# Patient Record
Sex: MALE | Race: BLACK OR AFRICAN AMERICAN | URBAN - METROPOLITAN AREA
[De-identification: names, ages, dates, MRNs, and addresses within clinical notes are randomized per-mention and may not be internally consistent; named-entity substitution may affect disease eponyms.]

---

## 2024-07-12 ENCOUNTER — APPOINTMENT (OUTPATIENT)
Dept: PRIMARY CARE | Facility: CLINIC | Age: 24
End: 2024-07-12
Payer: MEDICARE

## 2024-08-27 ENCOUNTER — APPOINTMENT (OUTPATIENT)
Dept: PRIMARY CARE | Facility: CLINIC | Age: 24
End: 2024-08-27
Payer: MEDICARE

## 2024-08-27 VITALS
OXYGEN SATURATION: 97 % | WEIGHT: 128 LBS | DIASTOLIC BLOOD PRESSURE: 61 MMHG | BODY MASS INDEX: 18.96 KG/M2 | SYSTOLIC BLOOD PRESSURE: 98 MMHG | HEIGHT: 69 IN | HEART RATE: 71 BPM

## 2024-08-27 DIAGNOSIS — Z00.00 HEALTH CARE MAINTENANCE: ICD-10-CM

## 2024-08-27 DIAGNOSIS — Z76.89 ENCOUNTER TO ESTABLISH CARE: ICD-10-CM

## 2024-08-27 DIAGNOSIS — Z23 IMMUNIZATION DUE: ICD-10-CM

## 2024-08-27 DIAGNOSIS — Z13.220 LIPID SCREENING: ICD-10-CM

## 2024-08-27 DIAGNOSIS — Z11.3 SCREENING EXAMINATION FOR STD (SEXUALLY TRANSMITTED DISEASE): ICD-10-CM

## 2024-08-27 DIAGNOSIS — J30.2 SEASONAL ALLERGIES: Primary | ICD-10-CM

## 2024-08-27 PROCEDURE — 99385 PREV VISIT NEW AGE 18-39: CPT

## 2024-08-27 PROCEDURE — 3008F BODY MASS INDEX DOCD: CPT

## 2024-08-27 RX ORDER — CETIRIZINE HYDROCHLORIDE 10 MG/1
10 TABLET ORAL DAILY
Qty: 30 TABLET | Refills: 2 | Status: SHIPPED | OUTPATIENT
Start: 2024-08-27 | End: 2024-11-25

## 2024-08-27 ASSESSMENT — ENCOUNTER SYMPTOMS
DIARRHEA: 0
NECK PAIN: 0
FEVER: 0
ABDOMINAL PAIN: 0
DEPRESSION: 0
FATIGUE: 0
HEADACHES: 0
COUGH: 0
NAUSEA: 0
CONSTIPATION: 0
UNEXPECTED WEIGHT CHANGE: 0
DIFFICULTY URINATING: 0
DIZZINESS: 0
BLOOD IN STOOL: 0
CHILLS: 0
RHINORRHEA: 0
SHORTNESS OF BREATH: 0
LOSS OF SENSATION IN FEET: 0
DYSURIA: 0
LIGHT-HEADEDNESS: 0
CHEST TIGHTNESS: 0
VOMITING: 0
OCCASIONAL FEELINGS OF UNSTEADINESS: 0
PALPITATIONS: 0

## 2024-08-27 ASSESSMENT — PATIENT HEALTH QUESTIONNAIRE - PHQ9
2. FEELING DOWN, DEPRESSED OR HOPELESS: NOT AT ALL
SUM OF ALL RESPONSES TO PHQ9 QUESTIONS 1 AND 2: 0
1. LITTLE INTEREST OR PLEASURE IN DOING THINGS: NOT AT ALL

## 2024-08-27 ASSESSMENT — PAIN SCALES - GENERAL: PAINLEVEL: 0-NO PAIN

## 2024-08-27 NOTE — PROGRESS NOTES
"Subjective   Patient ID: Dev Florez is a 23 y.o. male who presents for Establish Care (physical).  Mr. Florez is here to establish care and for a physical exam. He would like to know his blood type.      Patient doing well with no acute complaints at this time    Previous PCP: Never seen    PAST MEDICAL HISTORY:  - Was diagnosed with ADHD, took Focilin in the past but does not need it anymore.  - Reports seasonal allergies,     PAST SURGICAL HISTORY:  - Stomach surgery in 2004 or 2005 \"belly button sticking out\"    FAMILY HISTORY:  - Paternal grandmother passed of thyroid cancer, 67    SOCIAL HISTORY:  Tobacco: A few cigarettes as a teenager   ETOH: No  Drug: Occasional marijuana use for shoulder pain  Sexual hx: Not currently, has been as a teenager  Occupation: Looking for a job    ALLERGIES:  - Pollen    RECENT HOSPITALIZATIONS:  - No    RECENT PROCEDURES:  - No    PAST PREVENTATIVE CARE:  - None in a long time    Review of Systems   Constitutional:  Negative for chills, fatigue, fever and unexpected weight change.   HENT:  Negative for congestion, ear pain, rhinorrhea and sneezing.    Respiratory:  Negative for cough, chest tightness and shortness of breath.    Cardiovascular:  Negative for chest pain, palpitations and leg swelling.   Gastrointestinal:  Negative for abdominal pain, blood in stool, constipation, diarrhea, nausea and vomiting.   Genitourinary:  Negative for difficulty urinating and dysuria.   Musculoskeletal:  Negative for neck pain.        L shoulder pain, no pain today   Allergic/Immunologic: Positive for environmental allergies.   Neurological:  Negative for dizziness, light-headedness and headaches.       Objective   Physical Exam  Constitutional:       Appearance: Normal appearance.   Cardiovascular:      Rate and Rhythm: Normal rate and regular rhythm.      Pulses: Normal pulses.      Heart sounds: Normal heart sounds.   Pulmonary:      Effort: Pulmonary effort is normal.      Breath " sounds: Normal breath sounds.   Neurological:      Mental Status: He is alert.   Psychiatric:         Mood and Affect: Mood normal.         Behavior: Behavior normal.         Thought Content: Thought content normal.         Judgment: Judgment normal.         Assessment/Plan   Diagnoses and all orders for this visit:  Seasonal allergies  -     cetirizine (ZyrTEC) 10 mg tablet; Take 1 tablet (10 mg) by mouth once daily.  Lipid screening  -     Lipid panel; Future  Encounter to establish care  Health care maintenance  -     HIV 1/2 Antigen/Antibody Screen with Reflex to Confirmation; Future  -     Hepatitis C antibody; Future  -     C. trachomatis / N. gonorrhoeae, DNA probe; Future  -     Hepatitis B surface antibody; Future  Screening examination for STD (sexually transmitted disease)  -     HIV 1/2 Antigen/Antibody Screen with Reflex to Confirmation; Future  -     Hepatitis C antibody; Future  -     C. trachomatis / N. gonorrhoeae, DNA probe; Future  -     Hepatitis B surface antibody; Future  Immunization due  -     Tdap vaccine, age 7 years and older  (BOOSTRIX); Future  Other orders  -     Follow Up In Primary Care; Future        Plan to take over care which includes but not limited to:     -Conducting routine check-ups, physical exams, and health screenings to detect and prevent health problems  -Monitoring and managing chronic diseases   -Providing counseling and education on health promotion, disease prevention, and lifestyle modifications.  -Coordinating care with other healthcare providers to ensure continuity and quality of care.

## 2024-08-28 PROCEDURE — RXMED WILLOW AMBULATORY MEDICATION CHARGE

## 2024-08-28 ASSESSMENT — ENCOUNTER SYMPTOMS
RHINORRHEA: 0
HEADACHES: 0
DIFFICULTY URINATING: 0
FEVER: 0
LIGHT-HEADEDNESS: 0
ABDOMINAL PAIN: 0
PALPITATIONS: 0
NAUSEA: 0
CHILLS: 0
FATIGUE: 0
NECK PAIN: 0
BLOOD IN STOOL: 0
VOMITING: 0
CHEST TIGHTNESS: 0
UNEXPECTED WEIGHT CHANGE: 0
COUGH: 0
DIARRHEA: 0
DIZZINESS: 0
SHORTNESS OF BREATH: 0
CONSTIPATION: 0
DYSURIA: 0

## 2024-08-28 NOTE — PROGRESS NOTES
"Subjective   Patient ID: Dev Florez is a 23 y.o. male who presents for Establish Care (physical).  Mr. Florez is here to establish care and for a physical exam. He would like to know his blood type.      Patient doing well with no acute complaints at this time.   Wanted to check blood type. Explained it won't be covered by his insurance, so patient deferred it.     Previous PCP: Never seen    PAST MEDICAL HISTORY:  - Was diagnosed with ADHD, took Focalin in the past but does not need it anymore.  - Reports seasonal allergies    PAST SURGICAL HISTORY:  - Stomach surgery in 2004 or 2005 \"belly button sticking out\"    FAMILY HISTORY:  - Paternal grandmother passed of thyroid cancer, 67    SOCIAL HISTORY:  Tobacco: A few cigarettes as a teenager   ETOH: No  Drug: Occasional marijuana use for shoulder pain  Sexual hx: Not currently, has been as a teenager  Occupation: Looking for a job    ALLERGIES:  - Pollen    RECENT HOSPITALIZATIONS:  - No    RECENT PROCEDURES:  - No    PAST PREVENTATIVE CARE:  - None in a long time    Review of Systems   Constitutional:  Negative for chills, fatigue, fever and unexpected weight change.   HENT:  Negative for congestion, ear pain, rhinorrhea and sneezing.    Respiratory:  Negative for cough, chest tightness and shortness of breath.    Cardiovascular:  Negative for chest pain, palpitations and leg swelling.   Gastrointestinal:  Negative for abdominal pain, blood in stool, constipation, diarrhea, nausea and vomiting.   Genitourinary:  Negative for difficulty urinating and dysuria.   Musculoskeletal:  Negative for neck pain.        L shoulder pain, no pain today   Allergic/Immunologic: Positive for environmental allergies.   Neurological:  Negative for dizziness, light-headedness and headaches.       Objective   Physical Exam  Constitutional:       Appearance: Normal appearance.   Cardiovascular:      Rate and Rhythm: Normal rate and regular rhythm.      Pulses: Normal pulses.      " Heart sounds: Normal heart sounds.   Pulmonary:      Effort: Pulmonary effort is normal.      Breath sounds: Normal breath sounds.   Neurological:      Mental Status: He is alert.   Psychiatric:         Mood and Affect: Mood normal.         Behavior: Behavior normal.         Thought Content: Thought content normal.         Judgment: Judgment normal.         Assessment/Plan   Diagnoses and all orders for this visit:  Seasonal allergies  -     cetirizine (ZyrTEC) 10 mg tablet; Take 1 tablet (10 mg) by mouth once daily.  Lipid screening  -     Lipid panel; Future  Encounter to establish care  Health care maintenance  -     HIV 1/2 Antigen/Antibody Screen with Reflex to Confirmation; Future  -     Hepatitis C antibody; Future  -     C. trachomatis / N. gonorrhoeae, DNA probe; Future  -     Hepatitis B surface antibody; Future  Screening examination for STD (sexually transmitted disease)  -     HIV 1/2 Antigen/Antibody Screen with Reflex to Confirmation; Future  -     Hepatitis C antibody; Future  -     C. trachomatis / N. gonorrhoeae, DNA probe; Future  -     Hepatitis B surface antibody; Future  Immunization due  -     Tdap vaccine, age 7 years and older  (BOOSTRIX); Future  Other orders  -     Follow Up In Primary Care; Future        Plan to take over care which includes but not limited to:     -Conducting routine check-ups, physical exams, and health screenings to detect and prevent health problems  -Monitoring and managing chronic diseases   -Providing counseling and education on health promotion, disease prevention, and lifestyle modifications.  -Coordinating care with other healthcare providers to ensure continuity and quality of care.      Elif Holman MS3     I saw and evaluated the patient with the medical student. I personally obtained the key and critical portions of the history and physical exam. I reviewed and modified the student's documentation and discussed patient with the medical student. I agree  with the above documentation and medical decision making.     Discussed with Dr Toi Beltran MD  Family Medicine PGY3

## 2024-08-29 ENCOUNTER — PHARMACY VISIT (OUTPATIENT)
Dept: PHARMACY | Facility: CLINIC | Age: 24
End: 2024-08-29
Payer: MEDICAID

## 2024-08-30 NOTE — PROGRESS NOTES
I reviewed the resident/fellow's documentation and discussed the patient with the resident/fellow. I agree with the resident/fellow's medical decision making as documented in the note.     Brianne Cm MD

## 2024-09-03 ENCOUNTER — APPOINTMENT (OUTPATIENT)
Dept: PRIMARY CARE | Facility: CLINIC | Age: 24
End: 2024-09-03
Payer: MEDICARE

## 2024-09-09 ENCOUNTER — TELEPHONE (OUTPATIENT)
Dept: PRIMARY CARE | Facility: CLINIC | Age: 24
End: 2024-09-09
Payer: MEDICARE

## 2024-09-11 ENCOUNTER — APPOINTMENT (OUTPATIENT)
Dept: PRIMARY CARE | Facility: CLINIC | Age: 24
End: 2024-09-11
Payer: MEDICARE

## 2024-09-16 ENCOUNTER — APPOINTMENT (OUTPATIENT)
Dept: PRIMARY CARE | Facility: CLINIC | Age: 24
End: 2024-09-16
Payer: MEDICARE

## 2024-11-05 ENCOUNTER — APPOINTMENT (OUTPATIENT)
Dept: OPHTHALMOLOGY | Facility: CLINIC | Age: 24
End: 2024-11-05
Payer: MEDICARE

## 2025-03-10 ENCOUNTER — APPOINTMENT (OUTPATIENT)
Dept: OPHTHALMOLOGY | Facility: CLINIC | Age: 25
End: 2025-03-10
Payer: MEDICARE